# Patient Record
Sex: FEMALE | Race: WHITE | Employment: UNEMPLOYED | URBAN - METROPOLITAN AREA
[De-identification: names, ages, dates, MRNs, and addresses within clinical notes are randomized per-mention and may not be internally consistent; named-entity substitution may affect disease eponyms.]

---

## 2017-03-07 ENCOUNTER — HOSPITAL ENCOUNTER (EMERGENCY)
Age: 22
Discharge: HOME | End: 2017-03-08
Attending: EMERGENCY MEDICINE | Admitting: COLON & RECTAL SURGERY
Payer: COMMERCIAL

## 2017-03-07 ENCOUNTER — APPOINTMENT (OUTPATIENT)
Dept: CT IMAGING | Age: 22
End: 2017-03-07
Attending: EMERGENCY MEDICINE
Payer: COMMERCIAL

## 2017-03-07 DIAGNOSIS — K35.30 ACUTE APPENDICITIS WITH LOCALIZED PERITONITIS: Primary | ICD-10-CM

## 2017-03-07 LAB
ALBUMIN SERPL BCP-MCNC: 3.7 G/DL (ref 3.5–5)
ALBUMIN/GLOB SERPL: 0.9 {RATIO} (ref 1.2–3.5)
ALP SERPL-CCNC: 63 U/L (ref 50–136)
ALT SERPL-CCNC: 20 U/L (ref 12–65)
ANION GAP BLD CALC-SCNC: 7 MMOL/L (ref 7–16)
AST SERPL W P-5'-P-CCNC: 17 U/L (ref 15–37)
BASOPHILS # BLD AUTO: 0 K/UL (ref 0–0.2)
BASOPHILS # BLD: 0 % (ref 0–2)
BILIRUB SERPL-MCNC: 0.3 MG/DL (ref 0.2–1.1)
BUN SERPL-MCNC: 15 MG/DL (ref 6–23)
CALCIUM SERPL-MCNC: 8.9 MG/DL (ref 8.3–10.4)
CHLORIDE SERPL-SCNC: 105 MMOL/L (ref 98–107)
CO2 SERPL-SCNC: 29 MMOL/L (ref 21–32)
CREAT SERPL-MCNC: 0.97 MG/DL (ref 0.6–1)
CRP SERPL-MCNC: 9.5 MG/DL (ref 0–0.9)
DIFFERENTIAL METHOD BLD: NORMAL
EOSINOPHIL # BLD: 0.2 K/UL (ref 0–0.8)
EOSINOPHIL NFR BLD: 2 % (ref 0.5–7.8)
ERYTHROCYTE [DISTWIDTH] IN BLOOD BY AUTOMATED COUNT: 14.1 % (ref 11.9–14.6)
GLOBULIN SER CALC-MCNC: 3.9 G/DL (ref 2.3–3.5)
GLUCOSE SERPL-MCNC: 96 MG/DL (ref 65–100)
HCG UR QL: NEGATIVE
HCT VFR BLD AUTO: 37.2 % (ref 35.8–46.3)
HGB BLD-MCNC: 12.1 G/DL (ref 11.7–15.4)
IMM GRANULOCYTES # BLD: 0 K/UL (ref 0–0.5)
IMM GRANULOCYTES NFR BLD AUTO: 0.2 % (ref 0–5)
LIPASE SERPL-CCNC: 80 U/L (ref 73–393)
LYMPHOCYTES # BLD AUTO: 22 % (ref 13–44)
LYMPHOCYTES # BLD: 2.3 K/UL (ref 0.5–4.6)
MCH RBC QN AUTO: 27.1 PG (ref 26.1–32.9)
MCHC RBC AUTO-ENTMCNC: 32.5 G/DL (ref 31.4–35)
MCV RBC AUTO: 83.4 FL (ref 79.6–97.8)
MONOCYTES # BLD: 0.9 K/UL (ref 0.1–1.3)
MONOCYTES NFR BLD AUTO: 9 % (ref 4–12)
NEUTS SEG # BLD: 7 K/UL (ref 1.7–8.2)
NEUTS SEG NFR BLD AUTO: 67 % (ref 43–78)
PLATELET # BLD AUTO: 232 K/UL (ref 150–450)
PMV BLD AUTO: 11.2 FL (ref 10.8–14.1)
POTASSIUM SERPL-SCNC: 3.7 MMOL/L (ref 3.5–5.1)
PROT SERPL-MCNC: 7.6 G/DL (ref 6.3–8.2)
RBC # BLD AUTO: 4.46 M/UL (ref 4.05–5.25)
SODIUM SERPL-SCNC: 141 MMOL/L (ref 136–145)
WBC # BLD AUTO: 10.4 K/UL (ref 4.3–11.1)

## 2017-03-07 PROCEDURE — 80053 COMPREHEN METABOLIC PANEL: CPT | Performed by: EMERGENCY MEDICINE

## 2017-03-07 PROCEDURE — 74011250636 HC RX REV CODE- 250/636: Performed by: EMERGENCY MEDICINE

## 2017-03-07 PROCEDURE — 86140 C-REACTIVE PROTEIN: CPT | Performed by: EMERGENCY MEDICINE

## 2017-03-07 PROCEDURE — 74011636320 HC RX REV CODE- 636/320: Performed by: EMERGENCY MEDICINE

## 2017-03-07 PROCEDURE — 99285 EMERGENCY DEPT VISIT HI MDM: CPT | Performed by: EMERGENCY MEDICINE

## 2017-03-07 PROCEDURE — 81025 URINE PREGNANCY TEST: CPT

## 2017-03-07 PROCEDURE — 96365 THER/PROPH/DIAG IV INF INIT: CPT | Performed by: EMERGENCY MEDICINE

## 2017-03-07 PROCEDURE — 96375 TX/PRO/DX INJ NEW DRUG ADDON: CPT | Performed by: EMERGENCY MEDICINE

## 2017-03-07 PROCEDURE — 74011000258 HC RX REV CODE- 258: Performed by: EMERGENCY MEDICINE

## 2017-03-07 PROCEDURE — 81003 URINALYSIS AUTO W/O SCOPE: CPT | Performed by: EMERGENCY MEDICINE

## 2017-03-07 PROCEDURE — 83690 ASSAY OF LIPASE: CPT | Performed by: EMERGENCY MEDICINE

## 2017-03-07 PROCEDURE — 96361 HYDRATE IV INFUSION ADD-ON: CPT | Performed by: EMERGENCY MEDICINE

## 2017-03-07 PROCEDURE — 85025 COMPLETE CBC W/AUTO DIFF WBC: CPT | Performed by: EMERGENCY MEDICINE

## 2017-03-07 PROCEDURE — 74177 CT ABD & PELVIS W/CONTRAST: CPT

## 2017-03-07 RX ORDER — SODIUM CHLORIDE, SODIUM LACTATE, POTASSIUM CHLORIDE, CALCIUM CHLORIDE 600; 310; 30; 20 MG/100ML; MG/100ML; MG/100ML; MG/100ML
200 INJECTION, SOLUTION INTRAVENOUS ONCE
Status: COMPLETED | OUTPATIENT
Start: 2017-03-07 | End: 2017-03-08

## 2017-03-07 RX ORDER — SODIUM CHLORIDE 0.9 % (FLUSH) 0.9 %
10 SYRINGE (ML) INJECTION
Status: COMPLETED | OUTPATIENT
Start: 2017-03-07 | End: 2017-03-07

## 2017-03-07 RX ORDER — PROMETHAZINE HYDROCHLORIDE 12.5 MG/1
TABLET ORAL
COMMUNITY

## 2017-03-07 RX ORDER — ONDANSETRON 2 MG/ML
4 INJECTION INTRAMUSCULAR; INTRAVENOUS
Status: COMPLETED | OUTPATIENT
Start: 2017-03-07 | End: 2017-03-07

## 2017-03-07 RX ORDER — KETOROLAC TROMETHAMINE 30 MG/ML
30 INJECTION, SOLUTION INTRAMUSCULAR; INTRAVENOUS
Status: COMPLETED | OUTPATIENT
Start: 2017-03-07 | End: 2017-03-07

## 2017-03-07 RX ADMIN — KETOROLAC TROMETHAMINE 30 MG: 30 INJECTION, SOLUTION INTRAMUSCULAR at 18:41

## 2017-03-07 RX ADMIN — IOVERSOL 100 ML: 741 INJECTION INTRA-ARTERIAL; INTRAVENOUS at 19:35

## 2017-03-07 RX ADMIN — ONDANSETRON 4 MG: 2 INJECTION INTRAMUSCULAR; INTRAVENOUS at 18:41

## 2017-03-07 RX ADMIN — PIPERACILLIN SODIUM,TAZOBACTAM SODIUM 4.5 G: 4; .5 INJECTION, POWDER, FOR SOLUTION INTRAVENOUS at 20:33

## 2017-03-07 RX ADMIN — SODIUM CHLORIDE 1000 ML: 900 INJECTION, SOLUTION INTRAVENOUS at 18:41

## 2017-03-07 RX ADMIN — SODIUM CHLORIDE 100 ML: 900 INJECTION, SOLUTION INTRAVENOUS at 19:35

## 2017-03-07 RX ADMIN — SODIUM CHLORIDE, SODIUM LACTATE, POTASSIUM CHLORIDE, AND CALCIUM CHLORIDE 200 ML/HR: 600; 310; 30; 20 INJECTION, SOLUTION INTRAVENOUS at 23:15

## 2017-03-07 RX ADMIN — Medication 10 ML: at 19:35

## 2017-03-07 NOTE — IP AVS SNAPSHOT
[place] [address] 
[phone] Patient: Winnie Maya MRN: [id number] :1995 You are allergic to the following No active allergies Recent Documentation Height Weight BMI OB Status Smoking Status 1.651 m 70.3 kg 25.79 kg/m2 Having regular periods Never Smoker Emergency Contacts Name Discharge Info Relation Home Work Mobile Alan Roger  Boyienstu [17] [phone] About your hospitalization You were admitted on:  N/A You last received care in the:  [facility] PACU You were discharged on:  2017 Unit phone number:  [phone] Why you were hospitalized Your primary diagnosis was:  Acute Appendicitis With Localized Peritonitis Providers Seen During Your Hospitalizations Provider Role Specialty Primary office phone Tamie Roldan MD Attending Provider Emergency Medicine [phone] Your Primary Care Physician (PCP) Primary Care Physician Office Phone Office Fax NONE ** None ** ** None ** Follow-up Information Follow up With Details Comments Contact Info None   None ([phone]) Patient stated that they have no PCP Davide Hi MD Schedule an appointment as soon as possible for a visit in 2 week(s)  [address] [place] [address] 
[phone] Current Discharge Medication List  
  
START taking these medications Dose & Instructions Dispensing Information Comments Morning Noon Evening Bedtime  
 ondansetron 4 mg disintegrating tablet Commonly known as:  ZOFRAN ODT Your next dose is: Today, Tomorrow Other:  _________ Dose:  4 mg Take 1 Tab by mouth every eight (8) hours as needed for Nausea. Quantity:  20 Tab Refills:  0  
     
   
   
   
  
 oxyCODONE IR 5 mg immediate release tablet Commonly known as:  Ever Ivans  
   
 Your next dose is: Today, Tomorrow Other:  _________  
   
   
 1-2 tab q4h prn pain Quantity:  50 Tab Refills:  0 CONTINUE these medications which have NOT CHANGED Dose & Instructions Dispensing Information Comments Morning Noon Evening Bedtime  
 hyoscyamine SL 0.125 mg SL tablet Commonly known as:  LEVSIN/SL Your next dose is: Today, Tomorrow Other:  _________ Dose:  0.25 mg  
2 Tabs by SubLINGual route every six (6) hours as needed for Cramping. Quantity:  10 Tab Refills:  0  
     
   
   
   
  
 promethazine 12.5 mg tablet Commonly known as:  PHENERGAN Your next dose is: Today, Tomorrow Other:  _________ Take  by mouth every six (6) hours as needed for Nausea. Refills:  0 Where to Get Your Medications Information on where to get these meds will be given to you by the nurse or doctor. ! Ask your nurse or doctor about these medications  
  ondansetron 4 mg disintegrating tablet  
 oxyCODONE IR 5 mg immediate release tablet Discharge Instructions Postoperative Abdominal Surgery Instructions Eyad Mclaughlin MD 
[facility] and Rectal Surgery [facility], [address] Toni Sandhu 68 
[phone] Activity: 1. Remain active and walk several times per day, gradually increasing time and distance. You may walk and climb stairs without restrictions. 2. No driving while taking narcotic pain medication. You may ride in a car without restrictions. Frequent breaks are encouraged on long trips. 3. No strenuous activity or heavy lifting greater than 10 pounds (about a gallon of milk) for 3 weeks. This allows the deeper tissues to heal and decreases then chance of developing a hernia. 4. You may resume sexual relations 3 weeks after surgery 5.  Continue to work on taking deep breaths and using your incentive spirometer at home. 6. The average time out of work after abdominal surgery is 1-3 weeks. You may return sooner or to light duty work if you feel able. 7. It is normal to feel more tired and to fatigue more easily after an operation. Take naps as needed, but avoiding sleeping too much during the day that you cannot sleep at night. Diet: 1. Small frequent meals are usually better tolerated than large ones after an operation. 2. Ensure adequate hydration. 3. Avoid alcohol while taking narcotic pain medications Medications: 
1. You will be given a prescription for pain medication at the time of discharge. Take as instructed on the bottle. Pain pills take about 30 minutes to start working after they are swallowed, so take them before the pain becomes severe. 2. Many pain medications contain Tylenol (acetaminophen), and therefore Tylenol should not be taken in addition to the pain medication. You may take Ibuprofen 600 mg every 8 hours with food either in between narcotic pain medication or instead of it to help control your pain. 3. Many pain medications cannot be called in to a pharmacy. Watch the number of pills you have and let us know well before (2-3 days) you are due to run out. 4. You may purchase a stool softener over the counter if your stools are hard. Two examples are Colace 100 mg capsules twice daily or Surfak 240 mg capsules once daily. These should be taken with water. They will not cause diarrhea. 5. Resume all home medications, unless specifically addressed by your physician. 6. Do not use enemas or other laxatives, unless instructed by your physician Wound care: 1. May shower and rinse over the wound/incision. Pat the area dry. Dressings usually do not need to be applied. 2. No tubs or swimming for two weeks after surgery, until incisions are completely sealed.  
3. If there is a small amount of drainage from the wound, you may place a pad or gauze over the area to keep your clothes clean. Special instructions: 
1. Call Dr James Dominguez office at [phone] to schedule your follow up appointment in ~2 weeks. 2. Call the office for any of the following problems: 
a. Temperature greater than 101 
b. Persistent nausea or vomiting for greater than 24 hours, and an inability to keep down liquids 
c. Spreading redness around wound 
d. Opening up of wound, or large amount of drainage from the wound 
e. New or worsening pain, or pain uncontrolled by pain medications 
f. Large amount of loose stool, with concerns for dehydration. Your stools will be looser or more frequent than preoperatively, and this is normal. 
g. Passage of large amounts of blood or clots per rectum (small amounts of clot, streaks of blood, and small amounts of blood on the paper are normal and expected for the first few bowel movements) 
h. Inability to move your bowels after 2-3 days 
i. Inability to pass flatus for more than 24 hours, with distention (swelling) of your abdomen 3. Call the office with any other questions about your surgery. Discharge Orders None Introducing hospitals & HEALTH SERVICES! Hue Ross introduces [facility] [employer] patient portal. Now you can access parts of your medical record, email your doctor's office, and request medication refills online. 1. In your internet browser, go to [email or web address] 2. Click on the First Time User? Click Here link in the Sign In box. You will see the New Member Sign Up page. 3. Enter your [facility] [employer] Access Code exactly as it appears below. You will not need to use this code after youve completed the sign-up process. If you do not sign up before the expiration date, you must request a new code. · [facility] [employer] Access Code: [email or web address] Expires: 6/5/2017  4:38 PM 
 
4.  Enter the last four digits of your Social Security Number (xxxx) and Date of Birth (mm/dd/yyyy) as indicated and click Submit. You will be taken to the next sign-up page. 5. Create a [employer] ID. This will be your [employer] login ID and cannot be changed, so think of one that is secure and easy to remember. 6. Create a [employer] password. You can change your password at any time. 7. Enter your Password Reset Question and Answer. This can be used at a later time if you forget your password. 8. Enter your e-mail address. You will receive e-mail notification when new information is available in [address] 9. Click Sign Up. You can now view and download portions of your medical record. 10. Click the Download Summary menu link to download a portable copy of your medical information. If you have questions, please visit the Frequently Asked Questions section of the [employer] website. Remember, [employer] is NOT to be used for urgent needs. For medical emergencies, dial 911. Now available from your iPhone and Android! General Information Please provide this summary of care documentation to your next provider. Patient Signature:  ____________________________________________________________ Date:  ____________________________________________________________  
  
[facility] Provider Signature:  ____________________________________________________________ Date:  ____________________________________________________________

## 2017-03-07 NOTE — ED PROVIDER NOTES
HPI Comments: 22-year-old female 0.4 history of abdominal pain. Started yesterday afternoon is diffuse abdominal soreness so described as cramping and persistent pain. Seen in urgent care placed on Levsin and Phenergan. She had vomiting but no nausea and no diarrhea. No difficulty with urination or dysuria. Also no change in her menses. Today the pain is migrated to the lower right abdomen. No definite history of ovarian cysts. Patient is a 19 y.o. female presenting with abdominal pain. The history is provided by the patient. Abdominal Pain    This is a new problem. The current episode started yesterday. The problem occurs constantly. The pain is associated with vomiting. The pain is located in the generalized abdominal region and RLQ. The quality of the pain is dull. The pain is moderate. Associated symptoms include vomiting. Pertinent negatives include no fever, no diarrhea, no hematochezia, no melena, no nausea, no constipation, no dysuria, no frequency, no hematuria, no headaches, no chest pain and no back pain. Nothing worsens the pain. The pain is relieved by nothing. The patient's surgical history non-contributory. Past Medical History:   Diagnosis Date    Anxiety     Arthritis     Headache     Irregular periods 2016    Prediabetes     UTI (urinary tract infection)     Vaginal delivery 2015       History reviewed. No pertinent surgical history. Family History:   Problem Relation Age of Onset    Cancer Maternal Grandmother      Lung    Arthritis-osteo Maternal Grandmother     High Cholesterol Maternal Grandmother        Social History     Social History    Marital status: SINGLE     Spouse name: N/A    Number of children: N/A    Years of education: N/A     Occupational History    Not on file.      Social History Main Topics    Smoking status: Never Smoker    Smokeless tobacco: Never Used    Alcohol use No    Drug use: No    Sexual activity: Yes     Partners: Male Birth control/ protection: None     Other Topics Concern    Not on file     Social History Narrative         ALLERGIES: Review of patient's allergies indicates no known allergies. Review of Systems   Constitutional: Negative for chills and fever. Respiratory: Negative for cough and shortness of breath. Cardiovascular: Negative for chest pain and palpitations. Gastrointestinal: Positive for abdominal pain and vomiting. Negative for constipation, diarrhea, hematochezia, melena and nausea. Genitourinary: Negative for dysuria, flank pain, frequency and hematuria. Musculoskeletal: Negative for back pain and neck pain. Skin: Negative for color change and rash. Neurological: Negative for syncope and headaches. All other systems reviewed and are negative. Vitals:    03/07/17 1646   BP: 125/70   Pulse: 91   Resp: 16   Temp: 98.1 °F (36.7 °C)   Weight: 70.3 kg (155 lb)   Height: 5' 5\" (1.651 m)            Physical Exam   Constitutional: She is oriented to person, place, and time. She appears well-developed and well-nourished. No distress. HENT:   Head: Normocephalic and atraumatic. Mouth/Throat: Oropharynx is clear and moist. No oropharyngeal exudate. Eyes: Conjunctivae and EOM are normal. Pupils are equal, round, and reactive to light. Neck: Normal range of motion. Neck supple. Cardiovascular: Normal rate, regular rhythm and intact distal pulses. No murmur heard. Pulmonary/Chest: Breath sounds normal. No respiratory distress. Abdominal: Soft. Bowel sounds are normal. She exhibits no mass. There is tenderness in the right lower quadrant. There is tenderness at McBurney's point. There is no rebound, no guarding and negative Greene's sign. No hernia. No rebound. No Rosvigs   Neurological: She is alert and oriented to person, place, and time. Gait normal.   Nl speech   Skin: Skin is warm and dry. Psychiatric: She has a normal mood and affect.  Her speech is normal.   Nursing note and vitals reviewed. MDM  Number of Diagnoses or Management Options  Diagnosis management comments: Concern for appendicitis although this could be kidney stone, UTI, mesenteric adenitis or ovarian cyst.  Pain seems a little cephalad to be ovarian in origin. Amount and/or Complexity of Data Reviewed  Clinical lab tests: ordered and reviewed  Tests in the radiology section of CPT®: ordered and reviewed  Independent visualization of images, tracings, or specimens: yes    Risk of Complications, Morbidity, and/or Mortality  Presenting problems: moderate  Diagnostic procedures: low  Management options: moderate    Patient Progress  Patient progress: stable    ED Course       Procedures    Results Include:    Recent Results (from the past 24 hour(s))   HCG URINE, QL. - POC    Collection Time: 03/07/17  5:33 PM   Result Value Ref Range    Pregnancy test,urine (POC) NEGATIVE  NEG     LIPASE    Collection Time: 03/07/17  6:20 PM   Result Value Ref Range    Lipase 80 73 - 009 U/L   METABOLIC PANEL, COMPREHENSIVE    Collection Time: 03/07/17  6:20 PM   Result Value Ref Range    Sodium 141 136 - 145 mmol/L    Potassium 3.7 3.5 - 5.1 mmol/L    Chloride 105 98 - 107 mmol/L    CO2 29 21 - 32 mmol/L    Anion gap 7 7 - 16 mmol/L    Glucose 96 65 - 100 mg/dL    BUN 15 6 - 23 MG/DL    Creatinine 0.97 0.6 - 1.0 MG/DL    GFR est AA >60 >60 ml/min/1.73m2    GFR est non-AA >60 >60 ml/min/1.73m2    Calcium 8.9 8.3 - 10.4 MG/DL    Bilirubin, total 0.3 0.2 - 1.1 MG/DL    ALT (SGPT) 20 12 - 65 U/L    AST (SGOT) 17 15 - 37 U/L    Alk.  phosphatase 63 50 - 136 U/L    Protein, total 7.6 6.3 - 8.2 g/dL    Albumin 3.7 3.5 - 5.0 g/dL    Globulin 3.9 (H) 2.3 - 3.5 g/dL    A-G Ratio 0.9 (L) 1.2 - 3.5     C REACTIVE PROTEIN, QT    Collection Time: 03/07/17  6:20 PM   Result Value Ref Range    C-Reactive protein 9.5 (H) 0.0 - 0.9 mg/dL   CBC WITH AUTOMATED DIFF    Collection Time: 03/07/17  6:20 PM   Result Value Ref Range    WBC 10.4 4.3 - 11.1 K/uL    RBC 4.46 4.05 - 5.25 M/uL    HGB 12.1 11.7 - 15.4 g/dL    HCT 37.2 35.8 - 46.3 %    MCV 83.4 79.6 - 97.8 FL    MCH 27.1 26.1 - 32.9 PG    MCHC 32.5 31.4 - 35.0 g/dL    RDW 14.1 11.9 - 14.6 %    PLATELET 801 961 - 651 K/uL    MPV 11.2 10.8 - 14.1 FL    DF AUTOMATED      NEUTROPHILS 67 43 - 78 %    LYMPHOCYTES 22 13 - 44 %    MONOCYTES 9 4.0 - 12.0 %    EOSINOPHILS 2 0.5 - 7.8 %    BASOPHILS 0 0.0 - 2.0 %    IMMATURE GRANULOCYTES 0.2 0.0 - 5.0 %    ABS. NEUTROPHILS 7.0 1.7 - 8.2 K/UL    ABS. LYMPHOCYTES 2.3 0.5 - 4.6 K/UL    ABS. MONOCYTES 0.9 0.1 - 1.3 K/UL    ABS. EOSINOPHILS 0.2 0.0 - 0.8 K/UL    ABS. BASOPHILS 0.0 0.0 - 0.2 K/UL    ABS. IMM. GRANS. 0.0 0.0 - 0.5 K/UL     Ct Abd Pelv W Cont    Result Date: 3/7/2017  CT of the Abdomen and Pelvis with contrast CLINICAL INDICATION:  Acute abdominal pain which was generalized and now localized to the right lower quadrant, vomiting COMPARISON: Ultrasound abdomen 10/1/2016 TECHNIQUE: Automated exposure Control was used. Multiple axial images were obtained through the abdomen and pelvis after intravenous injection of 125cc of isovue 370. No oral contrast given per request, limiting evaluation of GI tract and surrounding structures. Coronal reformatted images obtained for further evaluation of organs. FINDINGS: The partially seen lung bases are clear. Abdomen:  No suspicious lesions in the liver or spleen. The adrenal glands and pancreas appear unremarkable. The gallbladder is decompressed. There is normal enhancement of the kidneys, no hydronephrosis. Gallbladder is decompressed and not well evaluated, grossly unremarkable. No lymphadenopathy, free air, focal inflammatory changes or fluid collections in the abdomen. Aorta normal caliber. Patent major vessels. Right lower quadrant appendix is dilated measuring up to 1.0 cm diameter with mild wall enhancement, surrounding trace fluid and moderate inflammatory stranding.  No evidence of abscess or extraluminal gas. There is no evidence of bowel obstruction. Prominent stool compatible with constipation. GI evaluation is limited without oral contrast. Moderate debris in the stomach. Pelvis:  No acute inflammatory changes or fluid collections in the pelvis. No lymphadenopathy or mass. Uterus and ovaries are present. Urinary bladder is unremarkable grossly. There is a small benign corpus luteum cyst in the left ovary. No acute osseous lesions are seen. IMPRESSION: 1. Acute appendicitis. 2. No abscess. 3. GI details are limited without oral contrast. No obstruction. DC4 The significant findings in this report have been called by telephone to the ordering physician in the ER at 8:00 PM.       Patient is being hydrated. Antibiotics ordered. Patient is nothing by mouth. Spoke with the surgeon and they will see the patient.

## 2017-03-07 NOTE — ED TRIAGE NOTES
Diffuse abd pain with vomiting yesterday, was seen at Urgent Care and was told she had stomach virus. Pt is here today because pain has becomed localized to RLQ and no longer having vomiting.

## 2017-03-08 ENCOUNTER — ANESTHESIA EVENT (OUTPATIENT)
Dept: SURGERY | Age: 22
End: 2017-03-08
Payer: COMMERCIAL

## 2017-03-08 ENCOUNTER — SURGERY (OUTPATIENT)
Age: 22
End: 2017-03-08

## 2017-03-08 ENCOUNTER — ANESTHESIA (OUTPATIENT)
Dept: SURGERY | Age: 22
End: 2017-03-08
Payer: COMMERCIAL

## 2017-03-08 VITALS
DIASTOLIC BLOOD PRESSURE: 67 MMHG | WEIGHT: 155 LBS | BODY MASS INDEX: 25.83 KG/M2 | TEMPERATURE: 98.3 F | HEART RATE: 78 BPM | HEIGHT: 65 IN | OXYGEN SATURATION: 95 % | RESPIRATION RATE: 15 BRPM | SYSTOLIC BLOOD PRESSURE: 117 MMHG

## 2017-03-08 PROBLEM — K35.30 ACUTE APPENDICITIS WITH LOCALIZED PERITONITIS: Status: ACTIVE | Noted: 2017-03-08

## 2017-03-08 PROCEDURE — 77030031139 HC SUT VCRL2 J&J -A: Performed by: COLON & RECTAL SURGERY

## 2017-03-08 PROCEDURE — 76010000149 HC OR TIME 1 TO 1.5 HR: Performed by: COLON & RECTAL SURGERY

## 2017-03-08 PROCEDURE — 74011000250 HC RX REV CODE- 250: Performed by: COLON & RECTAL SURGERY

## 2017-03-08 PROCEDURE — 74011250636 HC RX REV CODE- 250/636

## 2017-03-08 PROCEDURE — 74011250636 HC RX REV CODE- 250/636: Performed by: ANESTHESIOLOGY

## 2017-03-08 PROCEDURE — 76210000016 HC OR PH I REC 1 TO 1.5 HR: Performed by: COLON & RECTAL SURGERY

## 2017-03-08 PROCEDURE — 77030008703 HC TU ET UNCUF COVD -A: Performed by: ANESTHESIOLOGY

## 2017-03-08 PROCEDURE — 77030034849: Performed by: COLON & RECTAL SURGERY

## 2017-03-08 PROCEDURE — 74011250637 HC RX REV CODE- 250/637: Performed by: ANESTHESIOLOGY

## 2017-03-08 PROCEDURE — 74011250636 HC RX REV CODE- 250/636: Performed by: COLON & RECTAL SURGERY

## 2017-03-08 PROCEDURE — 77030009403 HC ELECTRD ENDO MEGA -B: Performed by: COLON & RECTAL SURGERY

## 2017-03-08 PROCEDURE — 77030022474 HC RELD STPLR ENDO GIA COVD -C: Performed by: COLON & RECTAL SURGERY

## 2017-03-08 PROCEDURE — 77030009852 HC PCH RTVR ENDOSC COVD -B: Performed by: COLON & RECTAL SURGERY

## 2017-03-08 PROCEDURE — 77030035051: Performed by: COLON & RECTAL SURGERY

## 2017-03-08 PROCEDURE — 77030011640 HC PAD GRND REM COVD -A: Performed by: COLON & RECTAL SURGERY

## 2017-03-08 PROCEDURE — 76060000033 HC ANESTHESIA 1 TO 1.5 HR: Performed by: COLON & RECTAL SURGERY

## 2017-03-08 PROCEDURE — 77030002933 HC SUT MCRYL J&J -A: Performed by: COLON & RECTAL SURGERY

## 2017-03-08 PROCEDURE — 74011000258 HC RX REV CODE- 258: Performed by: COLON & RECTAL SURGERY

## 2017-03-08 PROCEDURE — 77030008477 HC STYL SATN SLP COVD -A: Performed by: ANESTHESIOLOGY

## 2017-03-08 PROCEDURE — 88304 TISSUE EXAM BY PATHOLOGIST: CPT | Performed by: COLON & RECTAL SURGERY

## 2017-03-08 PROCEDURE — 77030027498 HC RELD STLPR EGIA VASC COVD -C: Performed by: COLON & RECTAL SURGERY

## 2017-03-08 PROCEDURE — 77030035048 HC TRCR ENDOSC OPTCL COVD -B: Performed by: COLON & RECTAL SURGERY

## 2017-03-08 PROCEDURE — 77030008612 HC TRCR ENDOSC VRS COVD -C: Performed by: COLON & RECTAL SURGERY

## 2017-03-08 PROCEDURE — 74011000250 HC RX REV CODE- 250

## 2017-03-08 PROCEDURE — 77030008518 HC TBNG INSUF ENDO STRY -B: Performed by: COLON & RECTAL SURGERY

## 2017-03-08 PROCEDURE — 77030010507 HC ADH SKN DERMBND J&J -B: Performed by: COLON & RECTAL SURGERY

## 2017-03-08 PROCEDURE — 77030022473 HC HNDL ENDO GIA UNIV USDA -C: Performed by: COLON & RECTAL SURGERY

## 2017-03-08 RX ORDER — SODIUM CHLORIDE 0.9 % (FLUSH) 0.9 %
5-10 SYRINGE (ML) INJECTION AS NEEDED
Status: DISCONTINUED | OUTPATIENT
Start: 2017-03-08 | End: 2017-03-08 | Stop reason: HOSPADM

## 2017-03-08 RX ORDER — MIDAZOLAM HYDROCHLORIDE 1 MG/ML
INJECTION, SOLUTION INTRAMUSCULAR; INTRAVENOUS AS NEEDED
Status: DISCONTINUED | OUTPATIENT
Start: 2017-03-08 | End: 2017-03-08 | Stop reason: HOSPADM

## 2017-03-08 RX ORDER — PIPERACILLIN SODIUM, TAZOBACTAM SODIUM 4; .5 G/20ML; G/20ML
INJECTION, POWDER, LYOPHILIZED, FOR SOLUTION INTRAVENOUS
Status: DISCONTINUED
Start: 2017-03-08 | End: 2017-03-08 | Stop reason: CLARIF

## 2017-03-08 RX ORDER — ONDANSETRON 2 MG/ML
INJECTION INTRAMUSCULAR; INTRAVENOUS AS NEEDED
Status: DISCONTINUED | OUTPATIENT
Start: 2017-03-08 | End: 2017-03-08 | Stop reason: HOSPADM

## 2017-03-08 RX ORDER — ONDANSETRON 4 MG/1
4 TABLET, ORALLY DISINTEGRATING ORAL
Qty: 20 TAB | Refills: 0 | Status: SHIPPED | OUTPATIENT
Start: 2017-03-08

## 2017-03-08 RX ORDER — OXYCODONE HYDROCHLORIDE 5 MG/1
5 TABLET ORAL
Status: DISCONTINUED | OUTPATIENT
Start: 2017-03-08 | End: 2017-03-08 | Stop reason: HOSPADM

## 2017-03-08 RX ORDER — SUCCINYLCHOLINE CHLORIDE 20 MG/ML
INJECTION INTRAMUSCULAR; INTRAVENOUS AS NEEDED
Status: DISCONTINUED | OUTPATIENT
Start: 2017-03-08 | End: 2017-03-08 | Stop reason: HOSPADM

## 2017-03-08 RX ORDER — PROPOFOL 10 MG/ML
INJECTION, EMULSION INTRAVENOUS AS NEEDED
Status: DISCONTINUED | OUTPATIENT
Start: 2017-03-08 | End: 2017-03-08 | Stop reason: HOSPADM

## 2017-03-08 RX ORDER — SODIUM CHLORIDE, SODIUM LACTATE, POTASSIUM CHLORIDE, CALCIUM CHLORIDE 600; 310; 30; 20 MG/100ML; MG/100ML; MG/100ML; MG/100ML
INJECTION, SOLUTION INTRAVENOUS
Status: DISCONTINUED | OUTPATIENT
Start: 2017-03-08 | End: 2017-03-08 | Stop reason: HOSPADM

## 2017-03-08 RX ORDER — SODIUM CHLORIDE, SODIUM LACTATE, POTASSIUM CHLORIDE, CALCIUM CHLORIDE 600; 310; 30; 20 MG/100ML; MG/100ML; MG/100ML; MG/100ML
50 INJECTION, SOLUTION INTRAVENOUS CONTINUOUS
Status: DISCONTINUED | OUTPATIENT
Start: 2017-03-08 | End: 2017-03-08 | Stop reason: HOSPADM

## 2017-03-08 RX ORDER — OXYCODONE HYDROCHLORIDE 5 MG/1
TABLET ORAL
Qty: 50 TAB | Refills: 0 | Status: SHIPPED | OUTPATIENT
Start: 2017-03-08

## 2017-03-08 RX ORDER — NEOSTIGMINE METHYLSULFATE 1 MG/ML
INJECTION INTRAVENOUS AS NEEDED
Status: DISCONTINUED | OUTPATIENT
Start: 2017-03-08 | End: 2017-03-08 | Stop reason: HOSPADM

## 2017-03-08 RX ORDER — HYDROMORPHONE HYDROCHLORIDE 2 MG/ML
0.5 INJECTION, SOLUTION INTRAMUSCULAR; INTRAVENOUS; SUBCUTANEOUS
Status: DISCONTINUED | OUTPATIENT
Start: 2017-03-08 | End: 2017-03-08 | Stop reason: HOSPADM

## 2017-03-08 RX ORDER — LIDOCAINE HYDROCHLORIDE 20 MG/ML
INJECTION, SOLUTION EPIDURAL; INFILTRATION; INTRACAUDAL; PERINEURAL AS NEEDED
Status: DISCONTINUED | OUTPATIENT
Start: 2017-03-08 | End: 2017-03-08 | Stop reason: HOSPADM

## 2017-03-08 RX ORDER — ROCURONIUM BROMIDE 10 MG/ML
INJECTION, SOLUTION INTRAVENOUS AS NEEDED
Status: DISCONTINUED | OUTPATIENT
Start: 2017-03-08 | End: 2017-03-08 | Stop reason: HOSPADM

## 2017-03-08 RX ORDER — GLYCOPYRROLATE 0.2 MG/ML
INJECTION INTRAMUSCULAR; INTRAVENOUS AS NEEDED
Status: DISCONTINUED | OUTPATIENT
Start: 2017-03-08 | End: 2017-03-08 | Stop reason: HOSPADM

## 2017-03-08 RX ORDER — FENTANYL CITRATE 50 UG/ML
INJECTION, SOLUTION INTRAMUSCULAR; INTRAVENOUS AS NEEDED
Status: DISCONTINUED | OUTPATIENT
Start: 2017-03-08 | End: 2017-03-08 | Stop reason: HOSPADM

## 2017-03-08 RX ORDER — ALBUTEROL SULFATE 0.83 MG/ML
2.5 SOLUTION RESPIRATORY (INHALATION) AS NEEDED
Status: DISCONTINUED | OUTPATIENT
Start: 2017-03-08 | End: 2017-03-08 | Stop reason: HOSPADM

## 2017-03-08 RX ORDER — DEXAMETHASONE SODIUM PHOSPHATE 4 MG/ML
INJECTION, SOLUTION INTRA-ARTICULAR; INTRALESIONAL; INTRAMUSCULAR; INTRAVENOUS; SOFT TISSUE AS NEEDED
Status: DISCONTINUED | OUTPATIENT
Start: 2017-03-08 | End: 2017-03-08 | Stop reason: HOSPADM

## 2017-03-08 RX ORDER — BUPIVACAINE HYDROCHLORIDE AND EPINEPHRINE 2.5; 5 MG/ML; UG/ML
INJECTION, SOLUTION EPIDURAL; INFILTRATION; INTRACAUDAL; PERINEURAL AS NEEDED
Status: DISCONTINUED | OUTPATIENT
Start: 2017-03-08 | End: 2017-03-08 | Stop reason: HOSPADM

## 2017-03-08 RX ADMIN — SUCCINYLCHOLINE CHLORIDE 160 MG: 20 INJECTION INTRAMUSCULAR; INTRAVENOUS at 00:40

## 2017-03-08 RX ADMIN — OXYCODONE HYDROCHLORIDE 5 MG: 5 TABLET ORAL at 02:51

## 2017-03-08 RX ADMIN — PROPOFOL 150 MG: 10 INJECTION, EMULSION INTRAVENOUS at 00:40

## 2017-03-08 RX ADMIN — GLYCOPYRROLATE 0.3 MG: 0.2 INJECTION INTRAMUSCULAR; INTRAVENOUS at 01:31

## 2017-03-08 RX ADMIN — LIDOCAINE HYDROCHLORIDE 80 MG: 20 INJECTION, SOLUTION EPIDURAL; INFILTRATION; INTRACAUDAL; PERINEURAL at 00:39

## 2017-03-08 RX ADMIN — ROCURONIUM BROMIDE 5 MG: 10 INJECTION, SOLUTION INTRAVENOUS at 00:39

## 2017-03-08 RX ADMIN — SODIUM CHLORIDE, SODIUM LACTATE, POTASSIUM CHLORIDE, CALCIUM CHLORIDE: 600; 310; 30; 20 INJECTION, SOLUTION INTRAVENOUS at 00:32

## 2017-03-08 RX ADMIN — DEXAMETHASONE SODIUM PHOSPHATE 10 MG: 4 INJECTION, SOLUTION INTRA-ARTICULAR; INTRALESIONAL; INTRAMUSCULAR; INTRAVENOUS; SOFT TISSUE at 01:00

## 2017-03-08 RX ADMIN — NEOSTIGMINE METHYLSULFATE 3 MG: 1 INJECTION INTRAVENOUS at 01:31

## 2017-03-08 RX ADMIN — BUPIVACAINE HYDROCHLORIDE AND EPINEPHRINE BITARTRATE 30 ML: 2.5; .005 INJECTION, SOLUTION EPIDURAL; INFILTRATION; INTRACAUDAL; PERINEURAL at 00:59

## 2017-03-08 RX ADMIN — ROCURONIUM BROMIDE 20 MG: 10 INJECTION, SOLUTION INTRAVENOUS at 00:50

## 2017-03-08 RX ADMIN — HYDROMORPHONE HYDROCHLORIDE 0.5 MG: 2 INJECTION, SOLUTION INTRAMUSCULAR; INTRAVENOUS; SUBCUTANEOUS at 02:13

## 2017-03-08 RX ADMIN — FENTANYL CITRATE 100 MCG: 50 INJECTION, SOLUTION INTRAMUSCULAR; INTRAVENOUS at 00:36

## 2017-03-08 RX ADMIN — PIPERACILLIN SODIUM,TAZOBACTAM SODIUM 4.5 G: 4; .5 INJECTION, POWDER, FOR SOLUTION INTRAVENOUS at 01:41

## 2017-03-08 RX ADMIN — FENTANYL CITRATE 25 MCG: 50 INJECTION, SOLUTION INTRAMUSCULAR; INTRAVENOUS at 01:10

## 2017-03-08 RX ADMIN — HYDROMORPHONE HYDROCHLORIDE 0.5 MG: 2 INJECTION, SOLUTION INTRAMUSCULAR; INTRAVENOUS; SUBCUTANEOUS at 02:08

## 2017-03-08 RX ADMIN — ONDANSETRON 4 MG: 2 INJECTION INTRAMUSCULAR; INTRAVENOUS at 01:00

## 2017-03-08 RX ADMIN — MIDAZOLAM HYDROCHLORIDE 2 MG: 1 INJECTION, SOLUTION INTRAMUSCULAR; INTRAVENOUS at 00:32

## 2017-03-08 RX ADMIN — SODIUM CHLORIDE, SODIUM LACTATE, POTASSIUM CHLORIDE, CALCIUM CHLORIDE: 600; 310; 30; 20 INJECTION, SOLUTION INTRAVENOUS at 00:52

## 2017-03-08 NOTE — H&P
Doreen Kay M.D. Colon and Rectal Surgeon  [address], [address] [place] [address], [facility] Miguel Garcia   Phone: [phone] Fax: [phone]      ED Evaluation/Preop H&P Note  3/7/2017    Jonas Rodriguez  MRN: [id number]    Requesting Provider: Dr Leah Ruffin in ED    Primary Care Physician: None    Reason for Consult: appendicitis    HISTORY OF PRESENT ILLNESS:   Jonas Rodrigeuz is a 19y.o. year old female who I was asked to evaluate for possible appendicitis. The patient described a \"burst\" of diffuse abdominal pain yesterday while at school. She was seen in an urgent care facility and told she had the stomach flu. She went home and tried to sleep it off. She woke today with worsened pain, now localized more into the RLQ. She had multiple episodes of emesis yesterday, but none today. No fevers. In the ED, she was found to have normal WBC and a dilated appendix consistent with appendicitis. REVIEW OF SYSTEMS:   Constitutional: No fevers/chills, no weakness, no fatigue, no lightheadedness, no night sweats, no insomnia, no appetite changes, no weight changes, no memory problems. Eyes: No changes in vision, no diplopia, no tearing, no scotomata, no pain   Ears/Nose/Throat/Mouth:  No change in hearing, no tinnitus, no bleeding, no epistaxis, no nasal discharge, no sinusitis. Respiratory: No cough, no dyspnea, no wheezing, no hemoptysis, no sleep apnea   Cardiovascular: No chest pains, no chest pressure, no chest tightness, no palpitations   Gastrointestinal: +abdominal pains, no bowel habit changes, +nausea, +emesis, no hematochezia, no melena, no cramping, no constipation, no diarrhea, no incontinence, no jaundice, no diverticulosis.  Otherwise per HPI   Genitourinary: No dysuria, no hematuria, no urinary frequency, no nocturia, no recent UTIs   Musculoskeletal: No back/neck pain, no arthritis, no joint pain/swelling, no muscle pains   Skin: No rashes, no itching, no ulcers Hematologic:  No easy bruising, no anemia   Neurologic: No headaches, no dizziness, no seizures   Psychiatric: No depressive symptoms, no anxiety symptoms, no changes in mood, no substance abuse     PAST MEDICAL HISTORY:  None    PAST SURGICAL HISTORY:  None    ALLERGIES: No Known Allergies    HOME MEDICATIONS: phenergan PRN, levsin PRN    SOCIAL HISTORY:  to her  for 6 months. They live in [place] and are moving to [facility] soon. She is a  student at [employer]. She has one daughter. Nonsmoker. No alcohol use. PREVENTION: Never had a colonoscopy     FAMILY HISTORY: No colon or rectal cancer. No history of polyps. No breast, prostate, ovary, endometrial, gastric, or pancreatic cancers. MGM had lung cancer    PHYSICAL EXAM:  Blood pressure 125/70, pulse 91, temperature 98.1 °F (36.7 °C), resp. rate 16, height 5' 5\" (1.651 m), weight 155 lb (70.3 kg), last menstrual period 02/15/2017, not currently breastfeeding. Body mass index is 25.79 kg/(m^2). The patient was evaluated in the presence of a medical assistant  General: Normotensive, in no acute distress, well developed, well nourished appearing   Head:  AT/NC, no lesions   Eyes:  PERRLA, EOM's full, conjunctivae clear   Neck:  Supple, no masses, no lymphadenopathy, no thyromegaly, no carotid bruits   Chest:  Lungs clear, no rales, no rhonchi, no wheezes   Heart:  RR, no murmurs, no rubs, no gallops   Abdomen:  Soft, no masses, nondistended. Benign left side of abdomen. RLQ and suprapubic regions moderately TTP with some voluntary guarding, but no peritonitis. Minimally positive Rovsing's sign. Negative obturator sign   Rectal:  Deferred   Back:  Normal curvature, no tenderness. Negative Greene's punch. Extremities:  FROM, no deformities, no edema, no erythema   Neuro:  Physiologic, oriented x3, full affect, no localizing findings   Skin:  Normal, no rashes, no lesions noted.           Recent Labs      03/07/17 1820   WBC  10.4   HGB  12.1   HCT  37.2   PLT  232       Recent Labs      03/07/17   1820   NA  141   K  3.7   CL  105   CO2  29   BUN  15   CREA  0.97   TBILI  0.3   SGOT  17   ALT  20   AP  63       Lipase normal  Labs reviewed by me. IMAGING: Images reviewed by me. 3/7/17--CT abd/pelvis: \"IMPRESSION: 1. Acute appendicitis. 2. No abscess. 3. GI details are limited without oral contrast. No obstruction. \"    ASSESSMENT:  Acute, uncomplicated appendicitis  RLQ pain, due to above    RECOMMENDATIONS:  --I had an informed consent discussion with the patient. We discussed the planned operation, risks, benefits, and alternatives. We specifically discussed the risks of bleeding, infection, abscess, damage to other intraabdominal structures (bowel, bladder, blood vessels, nerves, ureters), hernia, fecal fistula, need for further procedures, as well as cardiac and respiratory risks of anesthesia and surgery. We discussed the possibility of finding a normal appendix at the time of her surgery, and that if I found a normal appendix, I would search for other possible causes of her RLQ pain. Questions were all answered. --Proceed with emergent lap or open appendectomy  --IV antibiotics    Mariah Carrasquillo MD  3/7/2017      Elements of this note have been created with speech-recognition software. As a result, errors in speech recognition may have occurred.

## 2017-03-08 NOTE — ANESTHESIA POSTPROCEDURE EVALUATION
Post-Anesthesia Evaluation and Assessment    Patient: Bethany Ely MRN: [id number]  SSN: [id number]    YOB: 1995  Age: 19 y.o. Sex: female       Cardiovascular Function/Vital Signs  Visit Vitals    /87    Pulse (!) 105    Temp 36.8 °C (98.3 °F)    Resp 15    Ht 5' 5\" (1.651 m)    Wt 70.3 kg (155 lb)    SpO2 99%    BMI 25.79 kg/m2       Patient is status post general anesthesia for Procedure(s):  APPENDECTOMY LAPAROSCOPIC. Nausea/Vomiting: None    Postoperative hydration reviewed and adequate. Pain:  Pain Scale 1: Numeric (0 - 10) (03/07/17 1941)  Pain Intensity 1: 3 (03/07/17 1941)   Managed    Neurological Status: At baseline    Mental Status and Level of Consciousness: Arousable    Pulmonary Status:   O2 Device: Room air (03/08/17 0200)   Adequate oxygenation and airway patent    Complications related to anesthesia: None    Post-anesthesia assessment completed.  No concerns    Signed By: Dipak Vega MD     March 8, 2017

## 2017-03-08 NOTE — ANESTHESIA PREPROCEDURE EVALUATION
Anesthetic History   No history of anesthetic complications            Review of Systems / Medical History  Patient summary reviewed, nursing notes reviewed and pertinent labs reviewed    Pulmonary  Within defined limits                 Neuro/Psych         Psychiatric history     Cardiovascular  Within defined limits                Exercise tolerance: >4 METS     GI/Hepatic/Renal  Within defined limits              Endo/Other  Within defined limits           Other Findings              Physical Exam    Airway  Mallampati: I  TM Distance: 4 - 6 cm  Neck ROM: normal range of motion   Mouth opening: Normal     Cardiovascular  Regular rate and rhythm,  S1 and S2 normal,  no murmur, click, rub, or gallop             Dental  No notable dental hx       Pulmonary  Breath sounds clear to auscultation               Abdominal         Other Findings            Anesthetic Plan    ASA: 2, emergent  Anesthesia type: general          Induction: Intravenous  Anesthetic plan and risks discussed with: Patient

## 2017-03-08 NOTE — DISCHARGE INSTRUCTIONS
Postoperative Abdominal Surgery Instructions  Chantel Ewing MD  [facility]--Colon and Rectal Surgery  [employer], [facility]  Toni Sandhu  [phone]    Activity:  1. Remain active and walk several times per day, gradually increasing time and distance. You may walk and climb stairs without restrictions. 2. No driving while taking narcotic pain medication. You may ride in a car without restrictions. Frequent breaks are encouraged on long trips. 3. No strenuous activity or heavy lifting greater than 10 pounds (about a gallon of milk) for 3 weeks. This allows the deeper tissues to heal and decreases then chance of developing a hernia. 4. You may resume sexual relations 3 weeks after surgery  5. Continue to work on taking deep breaths and using your incentive spirometer at home. 6. The average time out of work after abdominal surgery is 1-3 weeks. You may return sooner or to light duty work if you feel able. 7. It is normal to feel more tired and to fatigue more easily after an operation. Take naps as needed, but avoiding sleeping too much during the day that you cannot sleep at night. Diet:  1. Small frequent meals are usually better tolerated than large ones after an operation. 2. Ensure adequate hydration. 3. Avoid alcohol while taking narcotic pain medications  Medications:  1. You will be given a prescription for pain medication at the time of discharge. Take as instructed on the bottle. Pain pills take about 30 minutes to start working after they are swallowed, so take them before the pain becomes severe. 2. Many pain medications contain Tylenol (acetaminophen), and therefore Tylenol should not be taken in addition to the pain medication. You may take Ibuprofen 600 mg every 8 hours with food either in between narcotic pain medication or instead of it to help control your pain. 3. Many pain medications cannot be called in to a pharmacy.   Watch the number of pills you have and let us know well before (2-3 days) you are due to run out. 4. You may purchase a stool softener over the counter if your stools are hard. Two examples are Colace 100 mg capsules twice daily or Surfak 240 mg capsules once daily. These should be taken with water. They will not cause diarrhea. 5. Resume all home medications, unless specifically addressed by your physician. 6. Do not use enemas or other laxatives, unless instructed by your physician  Wound care:  1. May shower and rinse over the wound/incision. Pat the area dry. Dressings usually do not need to be applied. 2. No tubs or swimming for two weeks after surgery, until incisions are completely sealed. 3. If there is a small amount of drainage from the wound, you may place a pad or gauze over the area to keep your clothes clean. Special instructions:  1. Call Dr Ernestina Kidd office at [phone] to schedule your follow up appointment in ~2 weeks. 2. Call the office for any of the following problems:  a. Temperature greater than 101  b. Persistent nausea or vomiting for greater than 24 hours, and an inability to keep down liquids  c. Spreading redness around wound  d. Opening up of wound, or large amount of drainage from the wound  e. New or worsening pain, or pain uncontrolled by pain medications  f. Large amount of loose stool, with concerns for dehydration. Your stools will be looser or more frequent than preoperatively, and this is normal.  g. Passage of large amounts of blood or clots per rectum (small amounts of clot, streaks of blood, and small amounts of blood on the paper are normal and expected for the first few bowel movements)  h. Inability to move your bowels after 2-3 days  i. Inability to pass flatus for more than 24 hours, with distention (swelling) of your abdomen  3. Call the office with any other questions about your surgery.

## 2017-03-08 NOTE — OP NOTES
Operative Note   3/8/2017  [id number]    Preoperative diagnosis: Acute appendicitis    Postoperative diagnosis: Acute, non-perforated appendicitis    Procedure: Laparoscopic appendectomy     Surgeon: Leeann Bertrand MD     Assistant: None     EBL: 20 ml     Anesthesia: General     Findings: dilated, edematous, non-perforated appendix     Specimens: Appendix     Indications: Ninoska Leary is a 19 y.o. female who presented to the ED with abdominal pain. CT scan and physical exam were consistent with appendicitis. Procedure: The patient was evaluated in the preoperative holding area. The planned procedure was reviewed. We discussed risks of bleeding, infection, damage to bowel, bladder, blood vessels, nerves, and other intraabdominal structures. We also discussed the possibility of a normal appendix being found, and that would prompt an abdominal exploration to look for other causes, as well as an appendectomy. We discussed the possibility of need for further procedures, anesthetic complications, and cardiopulmonary complications. She was brought to the OR and general anesthesia was obtained. SCD's were placed, and she was placed supine on the OR table. All pressure points were padded. Antibiotics were administered. DVT prophylaxis was given. A cervantes catheter was placed. Time out was performed. A periumbilical incision was made and dissection carried down to the fascia. This was grasped with Kochers and elevated. The fascia was opened sharply and two stay sutures were placed. The peritoneum was entered bluntly, and a Aidan trocar placed. The abdomen was insufflated with carbon dioxide. A brief abdominal exploration was performed. Under direct vision, a 5 mm suprapubic port and a 5 mm left abdominal port were placed. The patient was placed head down, left side down. The appendix was identified. There was mild inflammation surrounding the appendix. The appendix was not perforated.   The mesoappendix was grasped and elevated and a window made at the base of the appendix at its junction with the cecum. The appendix was transected at its base with an endo SAMI purple load stapler. Next, the mesoappendix was isolated, and it was divided with an endo SAMI white load stapler. The appendix was placed in an Endo-bag. The RLQ was cleared of blood with the suction. Hemostasis on the staple lines was good. The distal TI was evaluated and was normal, with no evidence of fat wrapping or inflammation. There was no Meckel's diverticulum. Adnexa on the right side was normal.  The liver and gallbladder were unremarkable. The other visualized intraabdominal structures were normal.      The RLQ was checked one more time for hemostasis. Suprapubic and left abdominal ports were removed under direct visualization and there was no bleed. The Aidan was removed and the abdomen desufflated. The Endo-bag was removed through the periumbilical port. The fascia of this port was closed with interrupted 0-Vicryl sutures. Irrigation was performed in the subcutaneous tissues. Skin was closed with 4-0 subcuticular Monocryl. Dermabond was applied. 30 ml of 0.25% marcaine with epinephrine was instilled into the skin and subcutaneous tissues for postoperative analgesia and anesthesia. The cervantes catheter was removed. The patient was awakened, extubated, and taken to recovery in stable condition. Sponge, instrument, and needle counts were correct.     Rashmi Blackwood MD  3/8/2017 1:46 AM

## (undated) DEVICE — UNIVERSAL STAPLER: Brand: ENDO GIA ULTRA

## (undated) DEVICE — CONTAINER SPEC FRMLN 120ML --

## (undated) DEVICE — [HIGH FLOW INSUFFLATOR,  DO NOT USE IF PACKAGE IS DAMAGED,  KEEP DRY,  KEEP AWAY FROM SUNLIGHT,  PROTECT FROM HEAT AND RADIOACTIVE SOURCES.]: Brand: PNEUMOSURE

## (undated) DEVICE — REM POLYHESIVE ADULT PATIENT RETURN ELECTRODE: Brand: VALLEYLAB

## (undated) DEVICE — TROCAR ENDOSCP CONVERTERLESS THRD BLUNTPORT + DISP

## (undated) DEVICE — E-Z CLEAN, PTFE COATED, ELECTROSURGICAL LAPAROSCOPIC ELECTRODE, J-HOOK, 33 CM., SINGLE-USE, FOR USE WITH HAND CONTROL PENCIL: Brand: MEGADYNE

## (undated) DEVICE — SOL ANTI-FOG 6ML MEDC -- MEDICHOICE - CONVERT TO 358427

## (undated) DEVICE — SUTURE MCRYL SZ 4-0 L27IN ABSRB UD L19MM PS-2 1/2 CIR PRIM Y426H

## (undated) DEVICE — 2000CC GUARDIAN II: Brand: GUARDIAN

## (undated) DEVICE — DERMABOND SKIN ADH 0.7ML -- DERMABOND ADVANCED 12/BX

## (undated) DEVICE — (D)PREP SKN CHLRAPRP APPL 26ML -- CONVERT TO ITEM 371833

## (undated) DEVICE — ARTICULATION RELOAD WITH TRI-STAPLE TECHNOLOGY: Brand: ENDO GIA

## (undated) DEVICE — UNIVERSAL FIXATION CANNULA: Brand: VERSAONE

## (undated) DEVICE — RELOAD STPL 35MM THN VASC TISS WHT ENDOPATH ECHELON

## (undated) DEVICE — NEEDLE HYPO 21GA L1.5IN INTRAMUSCULAR S STL LATCH BVL UP

## (undated) DEVICE — SPECIMEN RETRIEVAL POUCH: Brand: ENDO CATCH GOLD

## (undated) DEVICE — LAP CHOLE: Brand: MEDLINE INDUSTRIES, INC.

## (undated) DEVICE — BUTTON SWITCH PENCIL BLADE ELECTRODE, HOLSTER: Brand: EDGE

## (undated) DEVICE — RELOAD STPL 45MM THCK TISS GRN W/ GRIPPING SURF TECHNOLOGY

## (undated) DEVICE — BLADELESS OPTICAL TROCAR WITH FIXATION CANNULA: Brand: VERSAPORT

## (undated) DEVICE — SUTURE SZ 0 27IN 5/8 CIR UR-6  TAPER PT VIOLET ABSRB VICRYL J603H

## (undated) DEVICE — TRAY CATH 16F DRN BG LTX -- CONVERT TO ITEM 363158